# Patient Record
Sex: FEMALE | Race: BLACK OR AFRICAN AMERICAN | NOT HISPANIC OR LATINO
[De-identification: names, ages, dates, MRNs, and addresses within clinical notes are randomized per-mention and may not be internally consistent; named-entity substitution may affect disease eponyms.]

---

## 2019-01-30 ENCOUNTER — TRANSCRIPTION ENCOUNTER (OUTPATIENT)
Age: 29
End: 2019-01-30

## 2019-01-31 ENCOUNTER — TRANSCRIPTION ENCOUNTER (OUTPATIENT)
Age: 29
End: 2019-01-31

## 2019-02-06 ENCOUNTER — TRANSCRIPTION ENCOUNTER (OUTPATIENT)
Age: 29
End: 2019-02-06

## 2019-09-23 ENCOUNTER — TRANSCRIPTION ENCOUNTER (OUTPATIENT)
Age: 29
End: 2019-09-23

## 2020-04-25 ENCOUNTER — MESSAGE (OUTPATIENT)
Age: 30
End: 2020-04-25

## 2022-03-14 ENCOUNTER — INPATIENT (INPATIENT)
Facility: HOSPITAL | Age: 32
LOS: 1 days | Discharge: HOME HEALTH | End: 2022-03-16
Attending: OBSTETRICS & GYNECOLOGY | Admitting: OBSTETRICS & GYNECOLOGY
Payer: COMMERCIAL

## 2022-03-14 ENCOUNTER — TRANSCRIPTION ENCOUNTER (OUTPATIENT)
Age: 32
End: 2022-03-14

## 2022-03-14 VITALS — DIASTOLIC BLOOD PRESSURE: 82 MMHG | SYSTOLIC BLOOD PRESSURE: 124 MMHG

## 2022-03-14 DIAGNOSIS — Z3A.00 WEEKS OF GESTATION OF PREGNANCY NOT SPECIFIED: ICD-10-CM

## 2022-03-14 DIAGNOSIS — O26.899 OTHER SPECIFIED PREGNANCY RELATED CONDITIONS, UNSPECIFIED TRIMESTER: ICD-10-CM

## 2022-03-14 DIAGNOSIS — Z34.80 ENCOUNTER FOR SUPERVISION OF OTHER NORMAL PREGNANCY, UNSPECIFIED TRIMESTER: ICD-10-CM

## 2022-03-14 LAB
BASOPHILS # BLD AUTO: 0.01 K/UL — SIGNIFICANT CHANGE UP (ref 0–0.2)
BASOPHILS NFR BLD AUTO: 0.1 % — SIGNIFICANT CHANGE UP (ref 0–2)
BLD GP AB SCN SERPL QL: NEGATIVE — SIGNIFICANT CHANGE UP
EOSINOPHIL # BLD AUTO: 0.05 K/UL — SIGNIFICANT CHANGE UP (ref 0–0.5)
EOSINOPHIL NFR BLD AUTO: 0.6 % — SIGNIFICANT CHANGE UP (ref 0–6)
HCT VFR BLD CALC: 38.9 % — SIGNIFICANT CHANGE UP (ref 34.5–45)
HGB BLD-MCNC: 12.7 G/DL — SIGNIFICANT CHANGE UP (ref 11.5–15.5)
IMM GRANULOCYTES NFR BLD AUTO: 0.6 % — SIGNIFICANT CHANGE UP (ref 0–1.5)
LYMPHOCYTES # BLD AUTO: 1.47 K/UL — SIGNIFICANT CHANGE UP (ref 1–3.3)
LYMPHOCYTES # BLD AUTO: 18.8 % — SIGNIFICANT CHANGE UP (ref 13–44)
MCHC RBC-ENTMCNC: 30.8 PG — SIGNIFICANT CHANGE UP (ref 27–34)
MCHC RBC-ENTMCNC: 32.6 GM/DL — SIGNIFICANT CHANGE UP (ref 32–36)
MCV RBC AUTO: 94.2 FL — SIGNIFICANT CHANGE UP (ref 80–100)
MONOCYTES # BLD AUTO: 0.66 K/UL — SIGNIFICANT CHANGE UP (ref 0–0.9)
MONOCYTES NFR BLD AUTO: 8.4 % — SIGNIFICANT CHANGE UP (ref 2–14)
NEUTROPHILS # BLD AUTO: 5.59 K/UL — SIGNIFICANT CHANGE UP (ref 1.8–7.4)
NEUTROPHILS NFR BLD AUTO: 71.5 % — SIGNIFICANT CHANGE UP (ref 43–77)
NRBC # BLD: 0 /100 WBCS — SIGNIFICANT CHANGE UP (ref 0–0)
PLATELET # BLD AUTO: 205 K/UL — SIGNIFICANT CHANGE UP (ref 150–400)
RBC # BLD: 4.13 M/UL — SIGNIFICANT CHANGE UP (ref 3.8–5.2)
RBC # FLD: 14.2 % — SIGNIFICANT CHANGE UP (ref 10.3–14.5)
RH IG SCN BLD-IMP: POSITIVE — SIGNIFICANT CHANGE UP
SARS-COV-2 RNA SPEC QL NAA+PROBE: SIGNIFICANT CHANGE UP
WBC # BLD: 7.83 K/UL — SIGNIFICANT CHANGE UP (ref 3.8–10.5)
WBC # FLD AUTO: 7.83 K/UL — SIGNIFICANT CHANGE UP (ref 3.8–10.5)

## 2022-03-14 RX ORDER — INFLUENZA VIRUS VACCINE 15; 15; 15; 15 UG/.5ML; UG/.5ML; UG/.5ML; UG/.5ML
0.5 SUSPENSION INTRAMUSCULAR ONCE
Refills: 0 | Status: DISCONTINUED | OUTPATIENT
Start: 2022-03-14 | End: 2022-03-16

## 2022-03-14 RX ORDER — CITRIC ACID/SODIUM CITRATE 300-500 MG
30 SOLUTION, ORAL ORAL ONCE
Refills: 0 | Status: COMPLETED | OUTPATIENT
Start: 2022-03-14 | End: 2022-03-14

## 2022-03-14 RX ORDER — CITRIC ACID/SODIUM CITRATE 300-500 MG
15 SOLUTION, ORAL ORAL EVERY 6 HOURS
Refills: 0 | Status: DISCONTINUED | OUTPATIENT
Start: 2022-03-14 | End: 2022-03-15

## 2022-03-14 RX ORDER — SODIUM CHLORIDE 9 MG/ML
1000 INJECTION, SOLUTION INTRAVENOUS
Refills: 0 | Status: DISCONTINUED | OUTPATIENT
Start: 2022-03-14 | End: 2022-03-15

## 2022-03-14 RX ORDER — AMPICILLIN TRIHYDRATE 250 MG
2 CAPSULE ORAL ONCE
Refills: 0 | Status: COMPLETED | OUTPATIENT
Start: 2022-03-14 | End: 2022-03-14

## 2022-03-14 RX ORDER — SODIUM CHLORIDE 9 MG/ML
1000 INJECTION, SOLUTION INTRAVENOUS
Refills: 0 | Status: DISCONTINUED | OUTPATIENT
Start: 2022-03-14 | End: 2022-03-16

## 2022-03-14 RX ORDER — SODIUM CHLORIDE 9 MG/ML
3 INJECTION INTRAMUSCULAR; INTRAVENOUS; SUBCUTANEOUS EVERY 8 HOURS
Refills: 0 | Status: DISCONTINUED | OUTPATIENT
Start: 2022-03-14 | End: 2022-03-16

## 2022-03-14 RX ORDER — AMPICILLIN TRIHYDRATE 250 MG
1 CAPSULE ORAL EVERY 4 HOURS
Refills: 0 | Status: DISCONTINUED | OUTPATIENT
Start: 2022-03-14 | End: 2022-03-15

## 2022-03-14 RX ORDER — OXYTOCIN 10 UNIT/ML
333.33 VIAL (ML) INJECTION
Qty: 20 | Refills: 0 | Status: DISCONTINUED | OUTPATIENT
Start: 2022-03-14 | End: 2022-03-16

## 2022-03-14 RX ADMIN — Medication 200 GRAM(S): at 21:41

## 2022-03-14 RX ADMIN — Medication 30 MILLILITER(S): at 20:16

## 2022-03-14 NOTE — OB PROVIDER LABOR PROGRESS NOTE - ASSESSMENT
Plan: 31y y/o  @ 40w in stable condition  - Con't expectant management  - Pt desires epidural  - Continuous EFM, [place]  - Con't IVF    plan per attending Dr Liseth Frank MD  PGY-1

## 2022-03-14 NOTE — OB PROVIDER H&P - ASSESSMENT
Plan  1. Admit to L+D. Routine Labs. IVF.  2. Expectant management.  3. Fetus: cat 1 tracing. VTX. EFW 3375g. Continuous EFM. Sono. No concerns.  4. Prenatal issues: none  5. GBS (+), on ampicillin  6. Pain: IV pain meds/epidural PRN; pt currently declines    Plan per attending physician, Dr. Liseth Frank MD  PGY1

## 2022-03-14 NOTE — OB PROVIDER H&P - ATTENDING COMMENTS
P1 @ term admitted in early labor.   - s/p terb x2 for prolonged bill cardia.   - now 8.5/100/-1. FHRT reassuring. Will start pitocin to reinitiate contractions and place in L lateral with peanut ball  - all considerations discussed at length with patient.

## 2022-03-14 NOTE — OB RN TRIAGE NOTE - NS_PRENATALLOC_OBGYN_ALL_OB
Per,  I have reviewed the results of the laboratory tests that we recently ordered.   The uric acid level was 6.3 which is not bad but to prevent gout we like to see it below 6.0. my recommendation would be to recheck this in a month(s) when you are long past this gout attack and if it is still elevated we will start you on the allopurinol. Just make a laboratory appointment(s).     Sincerely,   Per Chen MD      
MD Office

## 2022-03-14 NOTE — OB RN PATIENT PROFILE - FALL HARM RISK - UNIVERSAL INTERVENTIONS
Bed in lowest position, wheels locked, appropriate side rails in place/Call bell, personal items and telephone in reach/Instruct patient to call for assistance before getting out of bed or chair/Non-slip footwear when patient is out of bed/[place] to call system/Physically safe environment - no spills, clutter or unnecessary equipment/Purposeful Proactive Rounding/Room/bathroom lighting operational, light cord in reach

## 2022-03-14 NOTE — OB PROVIDER H&P - HISTORY OF PRESENT ILLNESS
R1 Admission H&P    Subjective  HPI: 31y  at 40w presents for painful contractions 10/10 pain since 430 am. +FM. -LOF. +CTXs. -VB. Pt denies any other concerns.    – PNC: Denies prenatal issues. GBS +.  EFW 3317g by extrapolation from sono 3175g 2 wks prior.  – OBHx:  FT  6#  – GynHx: denies  – PMH: denies  – PSH: denies  – Psych: denies   – Social: denies   – Meds: PNV   – Allergies: NKDA  – Will accept blood transfusions? Yes    Objective  – VS  T(C): 36.9 (22 @ 19:37)  HR: 86 (22 @ 19:47)  BP: 124/82 (22 @ 19:37)  RR: 18 (22 @ 19:37)  SpO2: 100% (22 @ 19:47)    Physical Exam  CV: RRR  Pulm: breathing comfortably on RA  Abd: gravid, nontender  Extr: moving all extremities with ease    – VE: 2/80/-3  – FHT: baseline 140, mod variability, +accels, -decels; loss of contact noted  – [place]: q6-7 min  – EFW: 3375g

## 2022-03-14 NOTE — OB PROVIDER TRIAGE NOTE - NSOBPROVIDERNOTE_OBGYN_ALL_OB_FT
Assessment  31y  at 40w presents for painful contractions every 4-5 mins since 430 am.    Plan  - VE changed from closed in office on Friday to /-3 today, gonzalo q 6-7 min on monitor and w/ 10/10 pain  - Continue to monitor, re-check in 1 hr  - Given GBS+ status and late entry to care, start GBS prophylaxis    Plan per attending physician, Dr. Liseth Frank MD  PGY1

## 2022-03-14 NOTE — OB RN TRIAGE NOTE - FALL HARM RISK - UNIVERSAL INTERVENTIONS
Bed in lowest position, wheels locked, appropriate side rails in place/Call bell, personal items and telephone in reach/Instruct patient to call for assistance before getting out of bed or chair/Non-slip footwear when patient is out of bed/[place] to call system/Physically safe environment - no spills, clutter or unnecessary equipment/Purposeful Proactive Rounding/Room/bathroom lighting operational, light cord in reach

## 2022-03-14 NOTE — OB PROVIDER TRIAGE NOTE - HISTORY OF PRESENT ILLNESS
R1 Admission H&P    Subjective  HPI: 31y  at 40w presents for painful contractions 10/10 pain since 430 am. +FM. -LOF. +CTXs. -VB. Pt denies any other concerns.    – PNC: Denies prenatal issues. GBS +.  EFW 3317g by extrapolation from sono 3175g 2 wks prior.  – OBHx:  FT  6#  – GynHx: denies  – PMH: denies  – PSH: denies  – Psych: denies   – Social: denies   – Meds: PNV   – Allergies: NKDA  – Will accept blood transfusions? Yes    Objective  – VS  T(C): 36.9 (22 @ 19:37)  HR: 86 (22 @ 19:47)  BP: 124/82 (22 @ 19:37)  RR: 18 (22 @ 19:37)  SpO2: 100% (22 @ 19:47)    Physical Exam  CV: RRR  Pulm: breathing comfortably on RA  Abd: gravid, nontender  Extr: moving all extremities with ease    – VE: 2/80/-3  – FHT: baseline 140, mod variability, +accels, -decels; loss of contact noted  – [place]: q6-7 min  – EFW: 3375g

## 2022-03-15 LAB
COVID-19 SPIKE DOMAIN AB INTERP: POSITIVE
COVID-19 SPIKE DOMAIN ANTIBODY RESULT: >250 U/ML — HIGH
SARS-COV-2 IGG+IGM SERPL QL IA: >250 U/ML — HIGH
SARS-COV-2 IGG+IGM SERPL QL IA: POSITIVE
T PALLIDUM AB TITR SER: NEGATIVE — SIGNIFICANT CHANGE UP

## 2022-03-15 RX ORDER — OXYTOCIN 10 UNIT/ML
333.33 VIAL (ML) INJECTION
Qty: 20 | Refills: 0 | Status: DISCONTINUED | OUTPATIENT
Start: 2022-03-15 | End: 2022-03-16

## 2022-03-15 RX ORDER — OXYCODONE HYDROCHLORIDE 5 MG/1
5 TABLET ORAL ONCE
Refills: 0 | Status: DISCONTINUED | OUTPATIENT
Start: 2022-03-15 | End: 2022-03-16

## 2022-03-15 RX ORDER — AER TRAVELER 0.5 G/1
1 SOLUTION RECTAL; TOPICAL EVERY 4 HOURS
Refills: 0 | Status: DISCONTINUED | OUTPATIENT
Start: 2022-03-15 | End: 2022-03-16

## 2022-03-15 RX ORDER — KETOROLAC TROMETHAMINE 30 MG/ML
30 SYRINGE (ML) INJECTION ONCE
Refills: 0 | Status: DISCONTINUED | OUTPATIENT
Start: 2022-03-15 | End: 2022-03-15

## 2022-03-15 RX ORDER — MAGNESIUM HYDROXIDE 400 MG/1
30 TABLET, CHEWABLE ORAL
Refills: 0 | Status: DISCONTINUED | OUTPATIENT
Start: 2022-03-15 | End: 2022-03-16

## 2022-03-15 RX ORDER — HYDROCORTISONE 1 %
1 OINTMENT (GRAM) TOPICAL EVERY 6 HOURS
Refills: 0 | Status: DISCONTINUED | OUTPATIENT
Start: 2022-03-15 | End: 2022-03-16

## 2022-03-15 RX ORDER — LANOLIN
1 OINTMENT (GRAM) TOPICAL EVERY 6 HOURS
Refills: 0 | Status: DISCONTINUED | OUTPATIENT
Start: 2022-03-15 | End: 2022-03-16

## 2022-03-15 RX ORDER — DIBUCAINE 1 %
1 OINTMENT (GRAM) RECTAL EVERY 6 HOURS
Refills: 0 | Status: DISCONTINUED | OUTPATIENT
Start: 2022-03-15 | End: 2022-03-16

## 2022-03-15 RX ORDER — DIPHENHYDRAMINE HCL 50 MG
25 CAPSULE ORAL EVERY 6 HOURS
Refills: 0 | Status: DISCONTINUED | OUTPATIENT
Start: 2022-03-15 | End: 2022-03-16

## 2022-03-15 RX ORDER — SODIUM CHLORIDE 9 MG/ML
3 INJECTION INTRAMUSCULAR; INTRAVENOUS; SUBCUTANEOUS EVERY 8 HOURS
Refills: 0 | Status: DISCONTINUED | OUTPATIENT
Start: 2022-03-15 | End: 2022-03-16

## 2022-03-15 RX ORDER — OXYTOCIN 10 UNIT/ML
2 VIAL (ML) INJECTION
Qty: 30 | Refills: 0 | Status: DISCONTINUED | OUTPATIENT
Start: 2022-03-15 | End: 2022-03-16

## 2022-03-15 RX ORDER — PRAMOXINE HYDROCHLORIDE 150 MG/15G
1 AEROSOL, FOAM RECTAL EVERY 4 HOURS
Refills: 0 | Status: DISCONTINUED | OUTPATIENT
Start: 2022-03-15 | End: 2022-03-16

## 2022-03-15 RX ORDER — IBUPROFEN 200 MG
600 TABLET ORAL EVERY 6 HOURS
Refills: 0 | Status: COMPLETED | OUTPATIENT
Start: 2022-03-15 | End: 2023-02-11

## 2022-03-15 RX ORDER — BENZOCAINE 10 %
1 GEL (GRAM) MUCOUS MEMBRANE EVERY 6 HOURS
Refills: 0 | Status: DISCONTINUED | OUTPATIENT
Start: 2022-03-15 | End: 2022-03-16

## 2022-03-15 RX ORDER — IBUPROFEN 200 MG
600 TABLET ORAL EVERY 6 HOURS
Refills: 0 | Status: DISCONTINUED | OUTPATIENT
Start: 2022-03-15 | End: 2022-03-16

## 2022-03-15 RX ORDER — ACETAMINOPHEN 500 MG
975 TABLET ORAL
Refills: 0 | Status: DISCONTINUED | OUTPATIENT
Start: 2022-03-15 | End: 2022-03-16

## 2022-03-15 RX ORDER — TETANUS TOXOID, REDUCED DIPHTHERIA TOXOID AND ACELLULAR PERTUSSIS VACCINE, ADSORBED 5; 2.5; 8; 8; 2.5 [IU]/.5ML; [IU]/.5ML; UG/.5ML; UG/.5ML; UG/.5ML
0.5 SUSPENSION INTRAMUSCULAR ONCE
Refills: 0 | Status: DISCONTINUED | OUTPATIENT
Start: 2022-03-15 | End: 2022-03-16

## 2022-03-15 RX ORDER — SIMETHICONE 80 MG/1
80 TABLET, CHEWABLE ORAL EVERY 4 HOURS
Refills: 0 | Status: DISCONTINUED | OUTPATIENT
Start: 2022-03-15 | End: 2022-03-16

## 2022-03-15 RX ORDER — OXYCODONE HYDROCHLORIDE 5 MG/1
5 TABLET ORAL
Refills: 0 | Status: DISCONTINUED | OUTPATIENT
Start: 2022-03-15 | End: 2022-03-16

## 2022-03-15 RX ADMIN — Medication 975 MILLIGRAM(S): at 14:36

## 2022-03-15 RX ADMIN — Medication 975 MILLIGRAM(S): at 09:23

## 2022-03-15 RX ADMIN — Medication 975 MILLIGRAM(S): at 20:07

## 2022-03-15 RX ADMIN — Medication 600 MILLIGRAM(S): at 18:46

## 2022-03-15 RX ADMIN — Medication 2 MILLIUNIT(S)/MIN: at 02:39

## 2022-03-15 RX ADMIN — Medication 1 TABLET(S): at 12:35

## 2022-03-15 RX ADMIN — Medication 108 GRAM(S): at 01:52

## 2022-03-15 RX ADMIN — Medication 0.25 MILLIGRAM(S): at 01:57

## 2022-03-15 RX ADMIN — Medication 0.25 MILLIGRAM(S): at 02:00

## 2022-03-15 RX ADMIN — Medication 975 MILLIGRAM(S): at 20:37

## 2022-03-15 RX ADMIN — Medication 600 MILLIGRAM(S): at 17:46

## 2022-03-15 RX ADMIN — Medication 975 MILLIGRAM(S): at 15:36

## 2022-03-15 RX ADMIN — Medication 600 MILLIGRAM(S): at 23:31

## 2022-03-15 RX ADMIN — Medication 600 MILLIGRAM(S): at 13:37

## 2022-03-15 RX ADMIN — Medication 600 MILLIGRAM(S): at 12:37

## 2022-03-15 RX ADMIN — Medication 975 MILLIGRAM(S): at 10:23

## 2022-03-15 NOTE — OB RN DELIVERY SUMMARY - NSSELHIDDEN_OBGYN_ALL_OB_FT
[NS_DeliveryAttending1_OBGYN_ALL_OB_FT:[id number]==],[NS_DeliveryRN_OBGYN_ALL_OB_FT:[id number]=]

## 2022-03-15 NOTE — OB PROVIDER LABOR PROGRESS NOTE - ASSESSMENT
32yo  in labor making cervical change in active labor     - s/p terbutaline x2 with return to baseline  - maternal resuscitation  - ISE in place  - continuous monitoring  - routine labor care    d/w Dr. Childers   seen at bedside with Dr. Kemar Hinkle PGY2 30yo  in labor making cervical change in active labor     - s/p terbutaline x2 with return to baseline  - maternal resuscitation  - ISE in place  - continuous monitoring  - routine labor care    d/w Dr. Childers   seen at bedside with Dr. Kemar Hinkle PGY2         At the bedside for prolonged decel  Intrauterine resuscitation in progress - maternal position change to LLP, terb x 2 given for frequent ctxs, IVF   VE-7-8/90/-2  ISE placed   FHR recovered to 150's  Pt with no urge to push  -Will cont to monitor FHT as pt is progressing in labor  -Private OB enroute    BRIAN Reinoso

## 2022-03-15 NOTE — OB PROVIDER DELIVERY SUMMARY - NSPROVIDERDELIVERYNOTE_OBGYN_ALL_OB_FT
M in OA presentation. Clear fluid with no nuchal cord. Spont delivery intact placenta. Head and shoudlers delivered easily. 1st degree lac repaired in normal fashion

## 2022-03-15 NOTE — OB PROVIDER LABOR PROGRESS NOTE - ASSESSMENT
30yo  in labor    - maternal resuscitation   - continuous monitoring   - routine labor care  - anticipate     d/w Dr. Liseth Hinkle PGY2

## 2022-03-15 NOTE — OB RN DELIVERY SUMMARY - NS_SEPSISRSKCALC_OBGYN_ALL_OB_FT
EOS calculated successfully. EOS Risk Factor: 0.5/1000 live births ([facility] national incidence); GA=40w1d; Temp=99.32; ROM=9.317; GBS='Positive'; Antibiotics='Broad spectrum antibiotics > 4 hrs prior to birth'

## 2022-03-15 NOTE — OB PROVIDER LABOR PROGRESS NOTE - NS_SUBJECTIVE/OBJECTIVE_OBGYN_ALL_OB_FT
Pt examined at bedside for tetanic contraction and associated deceleration. Terbutaline x2 administered, maternal positional changes,  at bedside, ISE placed with return of FHR to baseline 135    Vital Signs Last 24 Hrs  T(C): 37.4 (14 Mar 2022 23:33), Max: 37.4 (14 Mar 2022 23:33)  T(F): 99.32 (14 Mar 2022 23:33), Max: 99.32 (14 Mar 2022 23:33)  HR: 66 (15 Mar 2022 02:05) (66 - 106)  BP: 142/71 (15 Mar 2022 01:56) (120/74 - 147/85)  BP(mean): --  RR: 18 (14 Mar 2022 23:33) (18 - 18)  SpO2: 100% (15 Mar 2022 02:00) (71% - 100%)
OB PA Progress Note    Pt seen and evaluated at bedside c/o increased pain with contractions. Pt offered epidural for pain control, declined.     Exam  VSS  SVE 3/80/-3, grossly ruptured clear fluid  EFM Cat I  [place]: Q5min
Pt examined at beside for cervical exam    Vital Signs Last 24 Hrs  T(C): 37.4 (14 Mar 2022 23:33), Max: 37.4 (14 Mar 2022 23:33)  T(F): 99.32 (14 Mar 2022 23:33), Max: 99.32 (14 Mar 2022 23:33)  HR: 93 (15 Mar 2022 01:33) (68 - 106)  BP: 120/74 (15 Mar 2022 01:25) (120/74 - 147/85)  BP(mean): --  RR: 18 (14 Mar 2022 23:33) (18 - 18)  SpO2: 93% (15 Mar 2022 01:33) (71% - 100%)
R1 Eval Note    Pt examined atbedside for recurrent variables, with improvement of tracing after exam.
R1 Labor & Delivery Progress Note     Pt seen & examined at bedside for increased rectal pressure.    SVE:  4.5/80/-3  EFM: 120/mod. variability/+accels/-decels  [place]: q 2-4 min    T(C): 37.4 (03-14-22 @ 23:33), Max: 37.4 (03-14-22 @ 23:33)  HR: 97 (03-14-22 @ 23:53) (78 - 97)  BP: 139/81 (03-14-22 @ 23:33) (124/82 - 139/81)  RR: 18 (03-14-22 @ 23:33) (18 - 18)  SpO2: 96% (03-14-22 @ 23:53) (71% - 100%)

## 2022-03-16 ENCOUNTER — TRANSCRIPTION ENCOUNTER (OUTPATIENT)
Age: 32
End: 2022-03-16

## 2022-03-16 VITALS
DIASTOLIC BLOOD PRESSURE: 75 MMHG | RESPIRATION RATE: 18 BRPM | TEMPERATURE: 98 F | OXYGEN SATURATION: 96 % | HEART RATE: 82 BPM | SYSTOLIC BLOOD PRESSURE: 110 MMHG

## 2022-03-16 PROCEDURE — 86769 SARS-COV-2 COVID-19 ANTIBODY: CPT

## 2022-03-16 PROCEDURE — 86850 RBC ANTIBODY SCREEN: CPT

## 2022-03-16 PROCEDURE — 87635 SARS-COV-2 COVID-19 AMP PRB: CPT

## 2022-03-16 PROCEDURE — 85025 COMPLETE CBC W/AUTO DIFF WBC: CPT

## 2022-03-16 PROCEDURE — G0463: CPT

## 2022-03-16 PROCEDURE — 86900 BLOOD TYPING SEROLOGIC ABO: CPT

## 2022-03-16 PROCEDURE — 86901 BLOOD TYPING SEROLOGIC RH(D): CPT

## 2022-03-16 PROCEDURE — 86780 TREPONEMA PALLIDUM: CPT

## 2022-03-16 PROCEDURE — 59025 FETAL NON-STRESS TEST: CPT

## 2022-03-16 PROCEDURE — 36415 COLL VENOUS BLD VENIPUNCTURE: CPT

## 2022-03-16 RX ORDER — IBUPROFEN 200 MG
1 TABLET ORAL
Qty: 0 | Refills: 0 | DISCHARGE
Start: 2022-03-16

## 2022-03-16 RX ORDER — SIMETHICONE 80 MG/1
1 TABLET, CHEWABLE ORAL
Qty: 0 | Refills: 0 | DISCHARGE
Start: 2022-03-16

## 2022-03-16 RX ORDER — ACETAMINOPHEN 500 MG
3 TABLET ORAL
Qty: 0 | Refills: 0 | DISCHARGE
Start: 2022-03-16

## 2022-03-16 RX ADMIN — Medication 975 MILLIGRAM(S): at 09:43

## 2022-03-16 RX ADMIN — Medication 975 MILLIGRAM(S): at 15:49

## 2022-03-16 RX ADMIN — Medication 600 MILLIGRAM(S): at 07:52

## 2022-03-16 RX ADMIN — Medication 975 MILLIGRAM(S): at 10:13

## 2022-03-16 RX ADMIN — Medication 600 MILLIGRAM(S): at 13:57

## 2022-03-16 RX ADMIN — Medication 600 MILLIGRAM(S): at 07:22

## 2022-03-16 RX ADMIN — Medication 975 MILLIGRAM(S): at 03:55

## 2022-03-16 RX ADMIN — Medication 600 MILLIGRAM(S): at 00:01

## 2022-03-16 RX ADMIN — Medication 975 MILLIGRAM(S): at 03:25

## 2022-03-16 RX ADMIN — Medication 1 TABLET(S): at 13:26

## 2022-03-16 RX ADMIN — Medication 975 MILLIGRAM(S): at 15:19

## 2022-03-16 RX ADMIN — Medication 600 MILLIGRAM(S): at 13:27

## 2022-03-16 NOTE — DISCHARGE NOTE OB - CARE PROVIDER_API CALL
Harman Childers)  Obstetrics and Gynecology  [address] [place] [address]  Phone: [phone]  Fax: [phone]  Follow Up Time:

## 2022-03-16 NOTE — PROGRESS NOTE ADULT - ASSESSMENT
30y/o  PPD#1 from normal spontaneous vaginal delivery. Patient is currently in stable condition.    #Routine Postpartum Care  - Continue with PO analgesia  - Increase ambulation  - Continue regular diet    Janay Frank  PGY-1

## 2022-03-16 NOTE — DISCHARGE NOTE OB - NS MD DC FALL RISK RISK
For information on Fall & Injury Prevention, visit: [email or web address] OR  [email or web address] OR  https://www.cdc.gov/steadi/patient.html

## 2022-03-16 NOTE — PROGRESS NOTE ADULT - SUBJECTIVE AND OBJECTIVE BOX
Patient seen and examined at bedside, no acute overnight events. No acute complaints, pain well controlled. Patient is ambulating, voiding spontaneously, passing gas, and tolerating regular diet. Denies CP, SOB, N/V, HA, blurred vision, epigastric pain.    Vital Signs Last 24 Hours  T(C): 36.9 (03-16-22 @ 04:45), Max: 37.6 (03-15-22 @ 09:15)  HR: 78 (03-16-22 @ 04:45) (65 - 192)  BP: 117/81 (03-16-22 @ 04:45) (107/71 - 144/63)  RR: 18 (03-16-22 @ 04:45) (16 - 18)  SpO2: 99% (03-16-22 @ 04:45) (98% - 100%)    Physical Exam:  General: NAD  Abdomen: Soft, non-tender, non-distended, fundus firm  Pelvic: Lochia wnl, external exam of perineum clean and dry without swelling    Labs:    Blood Type: B Positive  Antibody Screen: Negative  RPR: Negative               12.7   7.83  )-----------( 205      ( 03-14 @ 22:50 )             38.9         MEDICATIONS  (STANDING):  acetaminophen     Tablet .. 975 milliGRAM(s) Oral <User Schedule>  diphtheria/tetanus/pertussis (acellular) Vaccine (ADAcel) 0.5 milliLiter(s) IntraMuscular once  ibuprofen  Tablet. 600 milliGRAM(s) Oral every 6 hours  influenza   Vaccine 0.5 milliLiter(s) IntraMuscular once  lactated ringers. 1000 milliLiter(s) (125 mL/Hr) IV Continuous <Continuous>  oxytocin Infusion 333.333 milliUNIT(s)/Min (1000 mL/Hr) IV Continuous <Continuous>  oxytocin Infusion 333.333 milliUNIT(s)/Min (1000 mL/Hr) IV Continuous <Continuous>  oxytocin Infusion. 2 milliUNIT(s)/Min (2 mL/Hr) IV Continuous <Continuous>  prenatal multivitamin 1 Tablet(s) Oral daily  sodium chloride 0.9% lock flush 3 milliLiter(s) IV Push every 8 hours  sodium chloride 0.9% lock flush 3 milliLiter(s) IV Push every 8 hours    MEDICATIONS  (PRN):  benzocaine 20%/menthol 0.5% Spray 1 Spray(s) Topical every 6 hours PRN for Perineal discomfort  dibucaine 1% Ointment 1 Application(s) Topical every 6 hours PRN Perineal discomfort  diphenhydrAMINE 25 milliGRAM(s) Oral every 6 hours PRN Pruritus  hydrocortisone 1% Cream 1 Application(s) Topical every 6 hours PRN Moderate Pain (4-6)  lanolin Ointment 1 Application(s) Topical every 6 hours PRN nipple soreness  magnesium hydroxide Suspension 30 milliLiter(s) Oral two times a day PRN Constipation  oxyCODONE    IR 5 milliGRAM(s) Oral every 3 hours PRN Moderate to Severe Pain (4-10)  oxyCODONE    IR 5 milliGRAM(s) Oral once PRN Moderate to Severe Pain (4-10)  pramoxine 1%/zinc 5% Cream 1 Application(s) Topical every 4 hours PRN Moderate Pain (4-6)  simethicone 80 milliGRAM(s) Chew every 4 hours PRN Gas  witch hazel Pads 1 Application(s) Topical every 4 hours PRN Perineal discomfort

## 2022-03-16 NOTE — DISCHARGE NOTE OB - PATIENT PORTAL LINK FT
You can access the FollowMyHealth Patient Portal offered by [facility] by registering at the following website: [email or web address]. By joining [employer]’s FollowMyHealth portal, you will also be able to view your health information using other applications (apps) compatible with our system.

## 2022-11-08 ENCOUNTER — RESULT REVIEW (OUTPATIENT)
Age: 32
End: 2022-11-08

## 2023-04-11 NOTE — OB PROVIDER DELIVERY SUMMARY - NSVAGINALEXAMCERT_OBGYN_ALL_OB
Call marni or claire when you get menses to schedule HSG for cycle day 7-10    Get semen results    Cycle instructions:    The first day of bleeding/period is called Day 1.    Start testing for ovulation using the store bought ovulation predictor kits on Day 11 of cycle.  When you get a positive surge, you will most likely be ovulating within the next 24 hours.       Test the urine about the same time each day.         The test is positive when you see 2 dark solid lines or smiley face.  (one faint line and one dark line is NOT positive)       Once the test is positive, you can stop testing.  Have sex/intercourse the day the test is positive.  The next day, have sex again.    Schedule lab only appointment about 7-9 days after positive test    If you don't have a period by 15-16 days after LH kit is positive (surge) then do urine pregnancy test and call clinic if positive.      Basic info:  The ovulation predictor kits are found in the condom/tampon section of the store.  Clear blue easy brand is simple to read.  Most women will get a positive LH surge before day 20 of the cycle.    Do not use lubercants with intercourse when trying to get pregnant.    
The Delivery OB Provider certifies that vaginal examination and/or abdominal examination after the delivery was done and no foreign body was found.

## 2023-09-13 ENCOUNTER — NON-APPOINTMENT (OUTPATIENT)
Age: 33
End: 2023-09-13

## 2023-09-15 ENCOUNTER — OUTPATIENT (OUTPATIENT)
Dept: OUTPATIENT SERVICES | Facility: HOSPITAL | Age: 33
LOS: 1 days | End: 2023-09-15
Payer: COMMERCIAL

## 2023-09-15 VITALS
OXYGEN SATURATION: 100 % | SYSTOLIC BLOOD PRESSURE: 126 MMHG | HEIGHT: 62.5 IN | WEIGHT: 199.96 LBS | TEMPERATURE: 100 F | RESPIRATION RATE: 20 BRPM | HEART RATE: 87 BPM | DIASTOLIC BLOOD PRESSURE: 88 MMHG

## 2023-09-15 DIAGNOSIS — Z01.818 ENCOUNTER FOR OTHER PREPROCEDURAL EXAMINATION: ICD-10-CM

## 2023-09-15 DIAGNOSIS — Z33.2 ENCOUNTER FOR ELECTIVE TERMINATION OF PREGNANCY: ICD-10-CM

## 2023-09-15 DIAGNOSIS — K08.409 PARTIAL LOSS OF TEETH, UNSPECIFIED CAUSE, UNSPECIFIED CLASS: Chronic | ICD-10-CM

## 2023-09-15 LAB
ANION GAP SERPL CALC-SCNC: 14 MMOL/L — SIGNIFICANT CHANGE UP (ref 5–17)
APTT BLD: 28.5 SEC — SIGNIFICANT CHANGE UP (ref 24.5–35.6)
BLD GP AB SCN SERPL QL: NEGATIVE — SIGNIFICANT CHANGE UP
BUN SERPL-MCNC: 10 MG/DL — SIGNIFICANT CHANGE UP (ref 7–23)
CALCIUM SERPL-MCNC: 10 MG/DL — SIGNIFICANT CHANGE UP (ref 8.4–10.5)
CHLORIDE SERPL-SCNC: 103 MMOL/L — SIGNIFICANT CHANGE UP (ref 96–108)
CO2 SERPL-SCNC: 19 MMOL/L — LOW (ref 22–31)
CREAT SERPL-MCNC: 0.67 MG/DL — SIGNIFICANT CHANGE UP (ref 0.5–1.3)
EGFR: 118 ML/MIN/1.73M2 — SIGNIFICANT CHANGE UP
GLUCOSE SERPL-MCNC: 75 MG/DL — SIGNIFICANT CHANGE UP (ref 70–99)
HCT VFR BLD CALC: 37.3 % — SIGNIFICANT CHANGE UP (ref 34.5–45)
HGB BLD-MCNC: 12.9 G/DL — SIGNIFICANT CHANGE UP (ref 11.5–15.5)
INR BLD: 0.95 RATIO — SIGNIFICANT CHANGE UP (ref 0.85–1.18)
MCHC RBC-ENTMCNC: 32.1 PG — SIGNIFICANT CHANGE UP (ref 27–34)
MCHC RBC-ENTMCNC: 34.6 GM/DL — SIGNIFICANT CHANGE UP (ref 32–36)
MCV RBC AUTO: 92.8 FL — SIGNIFICANT CHANGE UP (ref 80–100)
NRBC # BLD: 0 /100 WBCS — SIGNIFICANT CHANGE UP (ref 0–0)
PLATELET # BLD AUTO: 271 K/UL — SIGNIFICANT CHANGE UP (ref 150–400)
POTASSIUM SERPL-MCNC: 3.8 MMOL/L — SIGNIFICANT CHANGE UP (ref 3.5–5.3)
POTASSIUM SERPL-SCNC: 3.8 MMOL/L — SIGNIFICANT CHANGE UP (ref 3.5–5.3)
PROTHROM AB SERPL-ACNC: 10.5 SEC — SIGNIFICANT CHANGE UP (ref 9.5–13)
RBC # BLD: 4.02 M/UL — SIGNIFICANT CHANGE UP (ref 3.8–5.2)
RBC # FLD: 12.6 % — SIGNIFICANT CHANGE UP (ref 10.3–14.5)
RH IG SCN BLD-IMP: POSITIVE — SIGNIFICANT CHANGE UP
SODIUM SERPL-SCNC: 136 MMOL/L — SIGNIFICANT CHANGE UP (ref 135–145)
WBC # BLD: 9.68 K/UL — SIGNIFICANT CHANGE UP (ref 3.8–10.5)
WBC # FLD AUTO: 9.68 K/UL — SIGNIFICANT CHANGE UP (ref 3.8–10.5)

## 2023-09-15 PROCEDURE — 86850 RBC ANTIBODY SCREEN: CPT

## 2023-09-15 PROCEDURE — 85730 THROMBOPLASTIN TIME PARTIAL: CPT

## 2023-09-15 PROCEDURE — 86900 BLOOD TYPING SEROLOGIC ABO: CPT

## 2023-09-15 PROCEDURE — 80048 BASIC METABOLIC PNL TOTAL CA: CPT

## 2023-09-15 PROCEDURE — 85610 PROTHROMBIN TIME: CPT

## 2023-09-15 PROCEDURE — G0463: CPT

## 2023-09-15 PROCEDURE — 85027 COMPLETE CBC AUTOMATED: CPT

## 2023-09-15 PROCEDURE — 86901 BLOOD TYPING SEROLOGIC RH(D): CPT

## 2023-09-15 RX ORDER — SODIUM CHLORIDE 9 MG/ML
1000 INJECTION, SOLUTION INTRAVENOUS
Refills: 0 | Status: DISCONTINUED | OUTPATIENT
Start: 2023-09-20 | End: 2023-10-04

## 2023-09-15 NOTE — H&P PST ADULT - HISTORY OF PRESENT ILLNESS
33 yr old female , , 15  33 yr old female , , Possibly 15 weeks pregnant as per ultrasound , as per Pt LMP is 2023, Coming in for  D& E with Ultrasound guidance on 2023.     Denies Recent travel, Exposure or Covid symptoms

## 2023-09-15 NOTE — H&P PST ADULT - ASSESSMENT
Airway:  normal    Mallampati-       Dental: Patient denies loose teeth    Activity :  dasi mets :     Airway:  normal    Mallampati-  3     Dental: Patient denies loose teeth    Activity : active all day  dasi mets : 7 dasi mets

## 2023-09-18 ENCOUNTER — APPOINTMENT (OUTPATIENT)
Dept: OBGYN | Facility: CLINIC | Age: 33
End: 2023-09-18
Payer: COMMERCIAL

## 2023-09-18 VITALS
HEIGHT: 62 IN | WEIGHT: 199 LBS | DIASTOLIC BLOOD PRESSURE: 80 MMHG | HEART RATE: 87 BPM | SYSTOLIC BLOOD PRESSURE: 126 MMHG | BODY MASS INDEX: 36.62 KG/M2

## 2023-09-18 DIAGNOSIS — Z78.9 OTHER SPECIFIED HEALTH STATUS: ICD-10-CM

## 2023-09-18 PROBLEM — E66.9 OBESITY, UNSPECIFIED: Chronic | Status: ACTIVE | Noted: 2023-09-15

## 2023-09-18 PROCEDURE — 99204 OFFICE O/P NEW MOD 45 MIN: CPT

## 2023-09-18 PROCEDURE — 76815 OB US LIMITED FETUS(S): CPT | Mod: 26

## 2023-09-19 ENCOUNTER — APPOINTMENT (OUTPATIENT)
Dept: OBGYN | Facility: CLINIC | Age: 33
End: 2023-09-19
Payer: COMMERCIAL

## 2023-09-19 ENCOUNTER — TRANSCRIPTION ENCOUNTER (OUTPATIENT)
Age: 33
End: 2023-09-19

## 2023-09-19 VITALS
WEIGHT: 203 LBS | HEIGHT: 62 IN | DIASTOLIC BLOOD PRESSURE: 80 MMHG | SYSTOLIC BLOOD PRESSURE: 117 MMHG | BODY MASS INDEX: 37.36 KG/M2

## 2023-09-19 DIAGNOSIS — Z33.2 ENCOUNTER FOR ELECTIVE TERMINATION OF PREGNANCY: ICD-10-CM

## 2023-09-19 DIAGNOSIS — R11.0 NAUSEA: ICD-10-CM

## 2023-09-19 DIAGNOSIS — Z64.0 PROBLEMS RELATED TO UNWANTED PREGNANCY: ICD-10-CM

## 2023-09-19 PROCEDURE — 99213 OFFICE O/P EST LOW 20 MIN: CPT | Mod: 25

## 2023-09-19 PROCEDURE — 59200 INSERT CERVICAL DILATOR: CPT

## 2023-09-19 RX ORDER — OXYCODONE AND ACETAMINOPHEN 5; 325 MG/1; MG/1
5-325 TABLET ORAL
Qty: 6 | Refills: 0 | Status: ACTIVE | COMMUNITY
Start: 2023-09-19 | End: 1900-01-01

## 2023-09-19 RX ORDER — AZITHROMYCIN 250 MG/1
250 TABLET, FILM COATED ORAL
Qty: 4 | Refills: 0 | Status: ACTIVE | COMMUNITY
Start: 2023-09-19 | End: 1900-01-01

## 2023-09-19 RX ORDER — ONDANSETRON 4 MG/1
4 TABLET ORAL EVERY 6 HOURS
Qty: 6 | Refills: 0 | Status: ACTIVE | COMMUNITY
Start: 2023-09-19 | End: 1900-01-01

## 2023-09-20 ENCOUNTER — TRANSCRIPTION ENCOUNTER (OUTPATIENT)
Age: 33
End: 2023-09-20

## 2023-09-20 ENCOUNTER — APPOINTMENT (OUTPATIENT)
Dept: OBGYN | Facility: CLINIC | Age: 33
End: 2023-09-20

## 2023-09-20 ENCOUNTER — RESULT REVIEW (OUTPATIENT)
Age: 33
End: 2023-09-20

## 2023-09-20 ENCOUNTER — NON-APPOINTMENT (OUTPATIENT)
Age: 33
End: 2023-09-20

## 2023-09-20 ENCOUNTER — OUTPATIENT (OUTPATIENT)
Dept: OUTPATIENT SERVICES | Facility: HOSPITAL | Age: 33
LOS: 1 days | End: 2023-09-20
Payer: COMMERCIAL

## 2023-09-20 VITALS
RESPIRATION RATE: 16 BRPM | HEART RATE: 86 BPM | TEMPERATURE: 98 F | DIASTOLIC BLOOD PRESSURE: 70 MMHG | SYSTOLIC BLOOD PRESSURE: 123 MMHG | OXYGEN SATURATION: 99 %

## 2023-09-20 VITALS
TEMPERATURE: 99 F | HEART RATE: 87 BPM | HEIGHT: 62.5 IN | WEIGHT: 199.96 LBS | OXYGEN SATURATION: 100 % | DIASTOLIC BLOOD PRESSURE: 77 MMHG | RESPIRATION RATE: 16 BRPM | SYSTOLIC BLOOD PRESSURE: 117 MMHG

## 2023-09-20 DIAGNOSIS — K08.409 PARTIAL LOSS OF TEETH, UNSPECIFIED CAUSE, UNSPECIFIED CLASS: Chronic | ICD-10-CM

## 2023-09-20 DIAGNOSIS — Z33.2 ENCOUNTER FOR ELECTIVE TERMINATION OF PREGNANCY: ICD-10-CM

## 2023-09-20 LAB
BLD GP AB SCN SERPL QL: NEGATIVE — SIGNIFICANT CHANGE UP
C TRACH RRNA SPEC QL NAA+PROBE: NOT DETECTED
N GONORRHOEA RRNA SPEC QL NAA+PROBE: NOT DETECTED
RH IG SCN BLD-IMP: POSITIVE — SIGNIFICANT CHANGE UP
SOURCE AMPLIFICATION: NORMAL

## 2023-09-20 PROCEDURE — 76998 US GUIDE INTRAOP: CPT | Mod: 26

## 2023-09-20 PROCEDURE — 88304 TISSUE EXAM BY PATHOLOGIST: CPT | Mod: 26

## 2023-09-20 PROCEDURE — 59841 INDUCED ABORTION DILAT&EVAC: CPT

## 2023-09-20 PROCEDURE — 88304 TISSUE EXAM BY PATHOLOGIST: CPT

## 2023-09-20 PROCEDURE — 86901 BLOOD TYPING SEROLOGIC RH(D): CPT

## 2023-09-20 PROCEDURE — 86900 BLOOD TYPING SEROLOGIC ABO: CPT

## 2023-09-20 PROCEDURE — 86850 RBC ANTIBODY SCREEN: CPT

## 2023-09-20 RX ORDER — CEFAZOLIN SODIUM 1 G
2000 VIAL (EA) INJECTION ONCE
Refills: 0 | Status: COMPLETED | OUTPATIENT
Start: 2023-09-20 | End: 2023-09-20

## 2023-09-20 RX ORDER — LIDOCAINE HCL 20 MG/ML
0.2 VIAL (ML) INJECTION ONCE
Refills: 0 | Status: COMPLETED | OUTPATIENT
Start: 2023-09-20 | End: 2023-09-20

## 2023-09-20 RX ORDER — SODIUM CHLORIDE 9 MG/ML
1000 INJECTION, SOLUTION INTRAVENOUS
Refills: 0 | Status: DISCONTINUED | OUTPATIENT
Start: 2023-09-20 | End: 2023-10-04

## 2023-09-20 RX ORDER — IBUPROFEN 200 MG
1 TABLET ORAL
Qty: 0 | Refills: 0 | DISCHARGE

## 2023-09-20 RX ORDER — ACETAMINOPHEN 500 MG
3 TABLET ORAL
Qty: 0 | Refills: 0 | DISCHARGE

## 2023-09-20 RX ORDER — FENTANYL CITRATE 50 UG/ML
25 INJECTION INTRAVENOUS
Refills: 0 | Status: DISCONTINUED | OUTPATIENT
Start: 2023-09-20 | End: 2023-09-20

## 2023-09-20 RX ADMIN — SODIUM CHLORIDE 100 MILLILITER(S): 9 INJECTION, SOLUTION INTRAVENOUS at 12:56

## 2023-09-20 NOTE — ASU DISCHARGE PLAN (ADULT/PEDIATRIC) - CARE PROVIDER_API CALL
Maria Alejandra Joaquin  Obstetrics and Gynecology  [address], [address]  [place] [address]  Phone: [phone]  Fax: [phone]  Established Patient  Follow Up Time: 2 weeks

## 2023-09-20 NOTE — BRIEF OPERATIVE NOTE - OPERATION/FINDINGS
Anteverted uterus approximately 16 weeks in size.   Dilapan x1, laminaria x2, sterile sponge x1 removed from vagina/cervix at start of procedure  Fetus and placenta appropriate for gestational age, all fetal parts accounted for. Thin endometrial stripe in sagittal and transverse views on ultrasound noted at the end of the procedure. Patient received pitocin 30mg and methergine IM. Anteverted uterus approximately 16 weeks in size. removal of 3 dilators.  cervix 3 cm dilated. fetus and placenta AGA, all fetal parts accounted for. BT: B+ . pitocin 30 units in IVF. methergine 0.2 mg IM x1

## 2023-09-20 NOTE — BRIEF OPERATIVE NOTE - NSICDXBRIEFPOSTOP_GEN_ALL_CORE_FT
POST-OP DIAGNOSIS:  Intrauterine pregnancy 20-Sep-2023 16:46:38 16w4d Jhoana Mclaughlin  Unplanned unwanted pregnancy 20-Sep-2023 16:46:43  Jhoana Mclaughlin   POST-OP DIAGNOSIS:  Unplanned unwanted pregnancy 20-Sep-2023 16:46:43  Jhoana Mclaughlin

## 2023-09-20 NOTE — ASU DISCHARGE PLAN (ADULT/PEDIATRIC) - ASU DC SPECIAL INSTRUCTIONSFT
Nothing per vagina for 2 weeks- no douching, tampons, sitz baths, sexual intercourse.  Call your doctor and go to the ER if you experience severe discomfort, chest pain, shortness of breath, fever greater than 100.4, of excessive vaginal bleeding greater than 1 pad/hr for 2 consecutive hours.  Take over the counter medications for pain control as directed. Follow up with your doctor in 2 weeks via telehealth.

## 2023-09-20 NOTE — ASU DISCHARGE PLAN (ADULT/PEDIATRIC) - NURSING INSTRUCTIONS
You received IV Tylenol at 4PM, Ketorolac IV ( similar as ibuprofen) at 4:15PM in OR. OK to take Tylenol/Acetaminophen at / after 10PM______ for pain and every 6 hours after as needed. OK to take Motrin/Ibuprofen at / after 10:15PM_____ for pain and every 6 hours after as needed. Take pain medicines alternate.

## 2023-09-20 NOTE — ASU DISCHARGE PLAN (ADULT/PEDIATRIC) - NS MD DC FALL RISK RISK
For information on Fall & Injury Prevention, visit: [email or web address] OR  [email or web address] OR  https://www.cdc.gov/steadi/patient.html

## 2023-09-20 NOTE — PRE-ANESTHESIA EVALUATION ADULT - NSANTHADDINFOFT_GEN_ALL_CORE
16 weeks gestation, juliana rahman 16 weeks gestation, juliana rspedro  after interview, it was found the patient ate peaches at 0900. After discussion with surgeon of available options; delay until 1500, cancel, deem case an emergency, we decided to delay case until 1500 and proceed

## 2023-09-20 NOTE — BRIEF OPERATIVE NOTE - NSICDXBRIEFPREOP_GEN_ALL_CORE_FT
PRE-OP DIAGNOSIS:  Intrauterine pregnancy 20-Sep-2023 16:45:52 16w4d Jhoana Mclaughlin  Unplanned unwanted pregnancy 20-Sep-2023 16:46:27  Jhoana Mclaughlin   PRE-OP DIAGNOSIS:  Unplanned unwanted pregnancy 20-Sep-2023 16:46:27 1. IUP @ 16 47 weeks  2. unplanned, undesired pregnancy   Jhoana Mclaughlin

## 2023-09-20 NOTE — ASU DISCHARGE PLAN (ADULT/PEDIATRIC) - MEDICATION INSTRUCTIONS
Take over the counter Tylenol as needed for pain. If you surgeon from your gastric sleeve told you it was ok to take NSAIDs, you may also take Ibuprofen as needed for pain.

## 2023-09-20 NOTE — BRIEF OPERATIVE NOTE - NSICDXBRIEFPROCEDURE_GEN_ALL_CORE_FT
PROCEDURES:  Pelvic examination under anesthesia 20-Sep-2023 16:44:22 removal of laminaria from cervix Jhoana Mclaughlin  Dilation and evacuation, uterus, using suction, with US guidance 20-Sep-2023 16:45:23  Jhoana Mclaughlin   PROCEDURES:  Induced  by dilation and evacuation 23-Sep-2023 08:52:16 1. EUA  2. removal of laminaria (3)  3. standard dilation and evacuation under ultrasound guidance Maria Alejandra Joaquin

## 2023-09-23 ENCOUNTER — NON-APPOINTMENT (OUTPATIENT)
Age: 33
End: 2023-09-23

## 2023-09-24 NOTE — ASU PATIENT PROFILE, ADULT - NS PRO PASSIVE SMOKE EXP
- Rest  - Ice packs can be applied to areas of swelling for 10-15 minutes every 3-4 hours while the patient is awake for the first 24-48 hours.   - Elevate the extremity as much as possible to help with swelling  - Alternate with Acetaminophen  as needed for pain.  - Encourage fluid intake.  - Apply topical lidocaine patch  -Take muscle relaxant  Instructions provided as documented in the AVS.  Thank you for visiting [facility].  Please follow up with your PCP ASAP.   No

## 2023-10-06 LAB — SURGICAL PATHOLOGY STUDY: SIGNIFICANT CHANGE UP

## 2025-04-22 ENCOUNTER — OUTPATIENT (OUTPATIENT)
Dept: OUTPATIENT SERVICES | Facility: HOSPITAL | Age: 35
LOS: 1 days | End: 2025-04-22
Payer: COMMERCIAL

## 2025-04-22 VITALS
HEIGHT: 62 IN | HEART RATE: 77 BPM | DIASTOLIC BLOOD PRESSURE: 85 MMHG | SYSTOLIC BLOOD PRESSURE: 124 MMHG | RESPIRATION RATE: 16 BRPM | OXYGEN SATURATION: 98 % | WEIGHT: 210.1 LBS | TEMPERATURE: 99 F

## 2025-04-22 DIAGNOSIS — K08.409 PARTIAL LOSS OF TEETH, UNSPECIFIED CAUSE, UNSPECIFIED CLASS: Chronic | ICD-10-CM

## 2025-04-22 DIAGNOSIS — R87.613 HIGH GRADE SQUAMOUS INTRAEPITHELIAL LESION ON CYTOLOGIC SMEAR OF CERVIX (HGSIL): ICD-10-CM

## 2025-04-22 DIAGNOSIS — Z01.818 ENCOUNTER FOR OTHER PREPROCEDURAL EXAMINATION: ICD-10-CM

## 2025-04-22 DIAGNOSIS — Z98.890 OTHER SPECIFIED POSTPROCEDURAL STATES: Chronic | ICD-10-CM

## 2025-04-22 PROBLEM — E66.9 OBESITY, UNSPECIFIED: Chronic | Status: INACTIVE | Noted: 2023-09-15 | Resolved: 2025-04-22

## 2025-04-22 PROCEDURE — G0463: CPT

## 2025-04-22 RX ORDER — SODIUM CHLORIDE 9 G/1000ML
1000 INJECTION, SOLUTION INTRAVENOUS
Refills: 0 | Status: DISCONTINUED | OUTPATIENT
Start: 2025-05-13 | End: 2025-05-27

## 2025-04-22 NOTE — H&P PST ADULT - TEMPERATURE IN FAHRENHEIT (DEGREES F)
Calling parent with  #[phone] to schedule a follow up appointment with Dr. Negrete. No answer,  was left with call back number. Date to offer August 29th in [place] with Dr. Negrete.     Estrella Avilez RN    
99.4

## 2025-04-22 NOTE — H&P PST ADULT - HISTORY OF PRESENT ILLNESS
34 year old [id number]  with PMH obesity (BMI 38.4) and anxiety/depression reports that she recently had a routine pap smear 2025 that was abnormal s/p colposcopy revealed HGSL on cytology. Pt now presents to PST for scheduled conization of cervix with Dr. Childers on 25 at the ambulatory care center. 34 year old female   with PMH obesity (BMI 38.4) and anxiety/depression reports that she recently had a routine pap smear 2025 that was abnormal s/p colposcopy revealed HGSIL on cytology. Pt now presents to PST for scheduled conization of cervix with Dr. Childers on 25 at the ambulatory care center.

## 2025-04-22 NOTE — H&P PST ADULT - NEGATIVE FEMALE-SPECIFIC SYMPTOMS
no irregular menses/no amenorrhea/no menorrhagia/no spotting/no abnormal vaginal bleeding/no pelvic pain

## 2025-04-22 NOTE — H&P PST ADULT - ASSESSMENT
DASI score: 9.89  DASI activity: Able to walk 2-3 blocks, ADLs, Grocery Shopping, 1 Flight of Stairs, works in security at [employer], goes to the gym about 3 days per week x 30-60 minutes hour (running on treadmill for about 10 minutes, weight-lifting)  Loose teeth or denture: denies

## 2025-04-22 NOTE — H&P PST ADULT - NSICDXPASTMEDICALHX_GEN_ALL_CORE_FT
PAST MEDICAL HISTORY:  Anxiety and depression     Class 2 severe obesity with serious comorbidity and body mass index (BMI) of 38.0 to 38.9 in adult     High grade squamous intraepithelial lesion of cervix

## 2025-04-22 NOTE — H&P PST ADULT - PROBLEM SELECTOR PLAN 1
Pt is scheduled for a conization of cervix with Dr. Childers on 5/13/25 at the ambulatory care center  Urine HCG on admit

## 2025-05-13 ENCOUNTER — TRANSCRIPTION ENCOUNTER (OUTPATIENT)
Age: 35
End: 2025-05-13

## 2025-05-13 ENCOUNTER — OUTPATIENT (OUTPATIENT)
Dept: OUTPATIENT SERVICES | Facility: HOSPITAL | Age: 35
LOS: 1 days | End: 2025-05-13
Payer: COMMERCIAL

## 2025-05-13 VITALS
RESPIRATION RATE: 18 BRPM | HEIGHT: 62 IN | TEMPERATURE: 97 F | OXYGEN SATURATION: 99 % | WEIGHT: 210.1 LBS | SYSTOLIC BLOOD PRESSURE: 118 MMHG | DIASTOLIC BLOOD PRESSURE: 78 MMHG | HEART RATE: 72 BPM

## 2025-05-13 VITALS
OXYGEN SATURATION: 100 % | HEART RATE: 58 BPM | SYSTOLIC BLOOD PRESSURE: 124 MMHG | DIASTOLIC BLOOD PRESSURE: 73 MMHG | RESPIRATION RATE: 15 BRPM

## 2025-05-13 DIAGNOSIS — Z98.890 OTHER SPECIFIED POSTPROCEDURAL STATES: Chronic | ICD-10-CM

## 2025-05-13 DIAGNOSIS — R87.613 HIGH GRADE SQUAMOUS INTRAEPITHELIAL LESION ON CYTOLOGIC SMEAR OF CERVIX (HGSIL): ICD-10-CM

## 2025-05-13 DIAGNOSIS — K08.409 PARTIAL LOSS OF TEETH, UNSPECIFIED CAUSE, UNSPECIFIED CLASS: Chronic | ICD-10-CM

## 2025-05-13 PROCEDURE — 88307 TISSUE EXAM BY PATHOLOGIST: CPT | Mod: 26

## 2025-05-13 PROCEDURE — 88305 TISSUE EXAM BY PATHOLOGIST: CPT

## 2025-05-13 PROCEDURE — C1889: CPT

## 2025-05-13 PROCEDURE — 57520 CONIZATION OF CERVIX: CPT

## 2025-05-13 PROCEDURE — 88305 TISSUE EXAM BY PATHOLOGIST: CPT | Mod: 26

## 2025-05-13 PROCEDURE — 88307 TISSUE EXAM BY PATHOLOGIST: CPT

## 2025-05-13 DEVICE — SURGICEL 2 X 14": Type: IMPLANTABLE DEVICE | Status: FUNCTIONAL

## 2025-05-13 RX ORDER — ONDANSETRON HCL/PF 4 MG/2 ML
4 VIAL (ML) INJECTION ONCE
Refills: 0 | Status: DISCONTINUED | OUTPATIENT
Start: 2025-05-13 | End: 2025-05-27

## 2025-05-13 RX ORDER — LIDOCAINE HCL/PF 10 MG/ML
0.2 VIAL (ML) INJECTION ONCE
Refills: 0 | Status: COMPLETED | OUTPATIENT
Start: 2025-05-13 | End: 2025-05-13

## 2025-05-13 RX ORDER — FENTANYL CITRATE-0.9 % NACL/PF 100MCG/2ML
25 SYRINGE (ML) INTRAVENOUS
Refills: 0 | Status: DISCONTINUED | OUTPATIENT
Start: 2025-05-13 | End: 2025-05-13

## 2025-05-13 RX ORDER — ACETAMINOPHEN 500 MG/5ML
3 LIQUID (ML) ORAL
Qty: 0 | Refills: 0 | DISCHARGE

## 2025-05-13 RX ORDER — IBUPROFEN 200 MG
1 TABLET ORAL
Qty: 0 | Refills: 0 | DISCHARGE

## 2025-05-13 RX ADMIN — SODIUM CHLORIDE 100 MILLILITER(S): 9 INJECTION, SOLUTION INTRAVENOUS at 08:08

## 2025-05-13 NOTE — BRIEF OPERATIVE NOTE - NSICDXBRIEFPOSTOP_GEN_ALL_CORE_FT
POST-OP DIAGNOSIS:  High grade squamous intraepithelial lesion (HGSIL), grade 2 CHU, on biopsy of cervix 13-May-2025 11:13:03  Phi Dill

## 2025-05-13 NOTE — BRIEF OPERATIVE NOTE - NSICDXBRIEFPREOP_GEN_ALL_CORE_FT
PRE-OP DIAGNOSIS:  High grade squamous intraepithelial lesion (HGSIL), grade 2 CHU, on biopsy of cervix 13-May-2025 11:12:53  Phi Dill

## 2025-05-13 NOTE — ASU DISCHARGE PLAN (ADULT/PEDIATRIC) - FINANCIAL ASSISTANCE
[facility] provides services at a reduced cost to those who are determined to be eligible through [facility]’s financial assistance program. Information regarding [facility]’s financial assistance program can be found by going to [email or web address] or by calling [phone].

## 2025-05-13 NOTE — BRIEF OPERATIVE NOTE - OPERATION/FINDINGS
External genitalia grossly normal. Lugol's solution applied to cervix with minimal non-staining areas. Stay sutures placed at 3 and 9 o'clock on the cervix. Cold knife conization performed. Silk suture placed at 12 o'clock on cone specimen. ECC performed. Bovie coagulation, Monsel's solution, and surgicel applied to wound bed with excellent hemostasis noted.

## 2025-05-13 NOTE — PRE-ANESTHESIA EVALUATION ADULT - NSANTHPMHFT_GEN_ALL_CORE
34 year old female   with PMH obesity (BMI 38.4) and anxiety/depression reports that she recently had a routine pap smear 2025 that was abnormal s/p colposcopy revealed HGSIL on cytology. Pt now presents for scheduled conization of cervix

## 2025-05-13 NOTE — ASU DISCHARGE PLAN (ADULT/PEDIATRIC) - CARE PROVIDER_API CALL
Harman Childers  Obstetrics and Gynecology  [address] [place] [address]  Phone: [phone]  Fax: [phone]  Follow Up Time: 2 weeks

## 2025-05-13 NOTE — ASU DISCHARGE PLAN (ADULT/PEDIATRIC) - NURSING INSTRUCTIONS
OK to take Tylenol/Acetaminophen at  4pm  for pain and every 6 hours after as needed. OK to take Motrin/Ibuprofen at for pain and every 6 hours after as needed.

## 2025-05-13 NOTE — ASU DISCHARGE PLAN (ADULT/PEDIATRIC) - NS MD DC FALL RISK RISK
For information on Fall & Injury Prevention, visit: [email or web address] OR  [email or web address]://[email or web address] OR  https://www.cdc.gov/steadi/patient.html

## 2025-05-14 LAB — SURGICAL PATHOLOGY STUDY: SIGNIFICANT CHANGE UP

## (undated) DEVICE — SOCK SPECIMEN 3/8"-1/2" MALE PORT

## (undated) DEVICE — ELCTR BALL FOR LLETZ 5MM X 5MM

## (undated) DEVICE — VACUUM CURETTE BERKLEY OLYMPUS STRAIGHT 11MM

## (undated) DEVICE — SUT SOFSILK 2-0 18" C-23

## (undated) DEVICE — TUBING UTERINE ASPIRATION 3/8" X 6FT W/O ADAPTER

## (undated) DEVICE — VACUUM CURETTE BERKLEY OLYMPUS STRAIGHT 16MM X 1/2"

## (undated) DEVICE — ELCTR BOVIE PENCIL SMOKE EVACUATION

## (undated) DEVICE — PACK LITHOTOMY

## (undated) DEVICE — VACUUM CURETTE BERKLEY OLYMPUS STRAIGHT 9MM

## (undated) DEVICE — SUCTION YANKAUER NO CONTROL VENT

## (undated) DEVICE — VACUUM CURETTE BERKLEY OLYMPUS CURVED 7MM

## (undated) DEVICE — SPECIMEN CONTAINER 100ML

## (undated) DEVICE — DRAPE INSTRUMENT POUCH 6.75" X 11"

## (undated) DEVICE — DRAPE 1/2 SHEET 40X57"

## (undated) DEVICE — Q TIP 6" WOOD STEM

## (undated) DEVICE — NDL SPINAL 22G X 3.5" (BLACK)

## (undated) DEVICE — ELCTR LOOP FOR LLETZ 10MM X 10MM

## (undated) DEVICE — WARMING BLANKET UPPER ADULT

## (undated) DEVICE — ELCTR LOOP FOR LLETZ 20MM X 15MM

## (undated) DEVICE — ELCTR BOVIE TIP CLEANER SCRATCH PAD

## (undated) DEVICE — ELCTR BALL FOR LEEP 5MM

## (undated) DEVICE — SOL IRR POUR NS 0.9% 500ML

## (undated) DEVICE — SYR LUER LOK 10CC

## (undated) DEVICE — VACUUM CURETTE MEDGYN CURVED 13MM

## (undated) DEVICE — GLV 7 PROTEXIS (WHITE)

## (undated) DEVICE — GOWN TRIMAX LG

## (undated) DEVICE — BLADE SCALPEL SAFETYLOCK #11

## (undated) DEVICE — SUT CHROMIC 3-0 30" V-20

## (undated) DEVICE — ELCTR BALL FOR LLETZ 3MM

## (undated) DEVICE — VACUUM CURETTE BERKLEY OLYMPUS CURVED 8MM

## (undated) DEVICE — VACUUM CURETTE BERKLEY OLYMPUS F TIP 6MM

## (undated) DEVICE — NDL COUNTER FOAM AND MAGNET 15-30

## (undated) DEVICE — VACUUM CURETTE BERKLEY OLYMPUS STRAIGHT 12MM

## (undated) DEVICE — VACUUM CURETTE BERKLEY OLYMPUS CURVED 9MM

## (undated) DEVICE — GLV 7.5 PROTEXIS (WHITE)

## (undated) DEVICE — ELCTR LOOP FOR LLETZ 15MM X 12MM

## (undated) DEVICE — POSITIONER FOAM EGG CRATE ULNAR 2PCS (PINK)

## (undated) DEVICE — DRAPE LIGHT HANDLE COVER (BLUE)

## (undated) DEVICE — PREP BETADINE KIT

## (undated) DEVICE — DRAPE LIGHT HANDLE COVER (GREEN)

## (undated) DEVICE — NDL HYPO REGULAR BEVEL 25G X 1.5" (BLUE)

## (undated) DEVICE — APPLICATOR RAYON PROCTO SWAB 16"

## (undated) DEVICE — GLV 6.5 PROTEXIS (WHITE)

## (undated) DEVICE — VACUUM CURETTE BERKLEY OLYMPUS CURVED 16MM X 1/2"

## (undated) DEVICE — VACUUM CURETTE BERKLEY OLYMPUS CURVED 11MM

## (undated) DEVICE — TUBING SUCTION 20FT

## (undated) DEVICE — VACUUM CURETTE BERKLEY OLYMPUS CURVED 12MM